# Patient Record
Sex: MALE | Race: WHITE
[De-identification: names, ages, dates, MRNs, and addresses within clinical notes are randomized per-mention and may not be internally consistent; named-entity substitution may affect disease eponyms.]

---

## 2019-09-09 ENCOUNTER — HOSPITAL ENCOUNTER (OUTPATIENT)
Dept: HOSPITAL 74 - FASU | Age: 54
Discharge: HOME | End: 2019-09-09
Attending: ORTHOPAEDIC SURGERY
Payer: COMMERCIAL

## 2019-09-09 VITALS — TEMPERATURE: 98.7 F | SYSTOLIC BLOOD PRESSURE: 129 MMHG | HEART RATE: 78 BPM | DIASTOLIC BLOOD PRESSURE: 74 MMHG

## 2019-09-09 VITALS — BODY MASS INDEX: 33.9 KG/M2

## 2019-09-09 DIAGNOSIS — M48.061: Primary | ICD-10-CM

## 2019-09-09 DIAGNOSIS — M48.07: ICD-10-CM

## 2019-09-09 PROCEDURE — 01NB0ZZ RELEASE LUMBAR NERVE, OPEN APPROACH: ICD-10-PCS | Performed by: ORTHOPAEDIC SURGERY

## 2019-09-09 NOTE — OP
DATE OF OPERATION:  09/09/2019

 

PREOPERATIVE DIAGNOSIS:  Spinal stenosis, L4-5, L5-S1.

 

POSTOPERATIVE DIAGNOSIS:  Spinal stenosis, L4-5, L5-S1.

 

PROCEDURE PERFORMED:  Laminectomy, L4-5, L5-S1.

 

SURGEON:  Caesar Leal MD

 

ASSISTANT:  NARCISO Anthony

 

ESTIMATED BLOOD LOSS:  50 mL.

 

IV FLUIDS:  Per Anesthesia.

 

ANESTHESIA:  Spinal/TLIP.

 

COMPLICATIONS:  There were none.

 

DISPOSITION:  Patient brought to the PACU in stable condition.

 

INDICATION FOR SURGERY:  The patient is a 54-year-old gentleman who has been

suffering from pain from his back down his legs.  X-rays and MRI were completed,

which noted that he had spinal stenosis at L4-5 and L5-S1.  He had gone through an

exhaustive course of treatment for this, which included medications, physical

therapy, as well as injections.  Unfortunately his pain continued to persist despite

all this.  At this point, risks, benefits and alternatives were discussed and the

patient consented to surgery.

 

OPERATIVE NOTE:  The patient was brought to the operating room by anesthesia staff. 

After appropriate patient identification was performed, spinal anesthesia was given. 

A TLIP block was also given.  The patient was able to position himself prone onto the

OR table, with all areas of bony prominences well padded.  At this time, 2 needles

were placed onto his back to jaison off the L4 to S1 sections.  X-ray was taken to

confirm this was correct.  The needles were removed and 10 mL of lidocaine with

epinephrine was injected into his back.  At this time, his back was prepped and

draped in a sterile manner.

 

At this point, timeout was completed.  An incision was made from the top of L4 down

to the bottom of S1.  Dissection was carried down to the fascia.  The fascia was

split open at this time and the appropriate retractors were then placed in.  A spinal

needle was placed onto the L4 lamina to jaison off the L4-5 level.  X-ray was taken to

confirm this is correct.  The needle was removed.  The interspinous ligament at L4-5

and L5-S1 was removed.  The spinous process of L5 was removed.  The lamina of L5 was

removed.  The flavum of L5 was removed. A complete decompression was performed such

that by the end of the procedure, the L5 and S1 nerve roots were well decompressed. 

A cyst was visualized.  It was taken off the dura and it was removed at this time. 

By the end of the procedure, all bleeding was well controlled.  Steroid was placed

over the nerve root, FloSeal was placed over that.  The fascia was closed with a

number 1 Vicryl suture.  The subcutaneous tissue was closed with 2-0 Vicryl suture. 

Skin was closed with 3-0 Monocryl suture.  Dermabond was applied, Steri-Strips were

applied, a sterile dressing was applied.  The patient was placed supine on the OR

bed, brought to the PACU in stable condition.

 

 

 

CAESAR LEAL M.D.

 

[id number]

DD: 09/09/2019 12:26

DT: 09/09/2019 14:42

Job #:  [id number]

## 2019-09-09 NOTE — OP
Operative Note





- Note:


Operative Date: 09/09/19


Pre-Operative Diagnosis: lumbar stenosis


Operation: laminecotmy of L4to L5 and L5- S1


Surgeon: Caesar Leal


Assistant: Yuly Magdaleno


Anesthesiologist/CRNA: Malena Kennedy


Anesthesia: Spinal


Estimated Blood Loss (mls): 20


Fluid Volume Replaced (mls): 1,000


Operative Report Dictated: Yes

## 2019-09-09 NOTE — SURG
Surgery First Assist Note


First Assist: Yuly Magdaleno PA-C


Date of Service: 09/09/19


Diagnosis: 





lumbar stenosis


Procedure: 





laminectomy of L4-L5 and L5- S1


I was present for the entirety of the operative procedure. For further detail, 

please refer to operative report.








Visit type





- Case Type


Case Type: Scheduled





- Emergency


Emergency Visit: No





- New patient


This patient is new to me today: Yes


Date on this admission: 09/09/19





- Critical Care


Critical Care patient: No